# Patient Record
Sex: MALE | Race: WHITE | Employment: OTHER | URBAN - METROPOLITAN AREA
[De-identification: names, ages, dates, MRNs, and addresses within clinical notes are randomized per-mention and may not be internally consistent; named-entity substitution may affect disease eponyms.]

---

## 2022-09-23 DIAGNOSIS — R06.02 SHORTNESS OF BREATH: Primary | ICD-10-CM

## 2022-10-11 ENCOUNTER — OFFICE VISIT (OUTPATIENT)
Dept: PULMONOLOGY | Age: 72
End: 2022-10-11
Payer: MEDICARE

## 2022-10-11 ENCOUNTER — HOSPITAL ENCOUNTER (OUTPATIENT)
Dept: GENERAL RADIOLOGY | Age: 72
Discharge: HOME | End: 2022-10-14
Payer: MEDICARE

## 2022-10-11 VITALS
OXYGEN SATURATION: 100 % | HEART RATE: 75 BPM | TEMPERATURE: 97.1 F | DIASTOLIC BLOOD PRESSURE: 75 MMHG | BODY MASS INDEX: 23.4 KG/M2 | WEIGHT: 158 LBS | HEIGHT: 69 IN | SYSTOLIC BLOOD PRESSURE: 155 MMHG

## 2022-10-11 DIAGNOSIS — R06.02 SHORTNESS OF BREATH: Primary | ICD-10-CM

## 2022-10-11 DIAGNOSIS — R06.02 SHORTNESS OF BREATH: ICD-10-CM

## 2022-10-11 DIAGNOSIS — J47.9 BRONCHIECTASIS WITHOUT COMPLICATION (HCC): ICD-10-CM

## 2022-10-11 LAB
EXPIRATORY TIME: NORMAL
FEF 25-75% %PRED-PRE: NORMAL
FEF 25-75% PRED: NORMAL
FEF 25-75%-PRE: NORMAL
FEV1 %PRED-PRE: 90 %
FEV1 PRED: NORMAL
FEV1/FVC %PRED-PRE: NORMAL
FEV1/FVC PRED: NORMAL
FEV1/FVC: 72 %
FEV1: 2.73 L
FVC %PRED-PRE: 91 %
FVC PRED: NORMAL
FVC: 3.78 L
PEF %PRED-PRE: NORMAL
PEF PRED: NORMAL
PEF-PRE: NORMAL

## 2022-10-11 PROCEDURE — 71046 X-RAY EXAM CHEST 2 VIEWS: CPT

## 2022-10-11 PROCEDURE — 99204 OFFICE O/P NEW MOD 45 MIN: CPT | Performed by: INTERNAL MEDICINE

## 2022-10-11 PROCEDURE — G8484 FLU IMMUNIZE NO ADMIN: HCPCS | Performed by: INTERNAL MEDICINE

## 2022-10-11 PROCEDURE — G8420 CALC BMI NORM PARAMETERS: HCPCS | Performed by: INTERNAL MEDICINE

## 2022-10-11 PROCEDURE — 94010 BREATHING CAPACITY TEST: CPT | Performed by: INTERNAL MEDICINE

## 2022-10-11 PROCEDURE — 1036F TOBACCO NON-USER: CPT | Performed by: INTERNAL MEDICINE

## 2022-10-11 PROCEDURE — 1123F ACP DISCUSS/DSCN MKR DOCD: CPT | Performed by: INTERNAL MEDICINE

## 2022-10-11 PROCEDURE — G8427 DOCREV CUR MEDS BY ELIG CLIN: HCPCS | Performed by: INTERNAL MEDICINE

## 2022-10-11 PROCEDURE — 3017F COLORECTAL CA SCREEN DOC REV: CPT | Performed by: INTERNAL MEDICINE

## 2022-10-11 RX ORDER — FENOFIBRATE 160 MG/1
160 TABLET ORAL DAILY
COMMUNITY
Start: 2022-09-15

## 2022-10-11 RX ORDER — FLUTICASONE FUROATE, UMECLIDINIUM BROMIDE AND VILANTEROL TRIFENATATE 100; 62.5; 25 UG/1; UG/1; UG/1
1 POWDER RESPIRATORY (INHALATION) DAILY
Qty: 1 EACH | Refills: 11 | Status: CANCELLED | OUTPATIENT
Start: 2022-10-11

## 2022-10-11 RX ORDER — IBUPROFEN 200 MG
TABLET ORAL PRN
COMMUNITY

## 2022-10-11 RX ORDER — FLUTICASONE FUROATE, UMECLIDINIUM BROMIDE AND VILANTEROL TRIFENATATE 100; 62.5; 25 UG/1; UG/1; UG/1
1 POWDER RESPIRATORY (INHALATION) DAILY
Qty: 1 EACH | Refills: 11 | Status: SHIPPED | OUTPATIENT
Start: 2022-10-11

## 2022-10-11 RX ORDER — ATORVASTATIN CALCIUM 10 MG/1
10 TABLET, FILM COATED ORAL
COMMUNITY
Start: 2022-09-15

## 2022-10-11 RX ORDER — SILDENAFIL 100 MG/1
50-100 TABLET, FILM COATED ORAL PRN
COMMUNITY
Start: 2022-09-15

## 2022-10-11 RX ORDER — ERGOCALCIFEROL 1.25 MG/1
CAPSULE ORAL DAILY
COMMUNITY

## 2022-10-11 ASSESSMENT — PULMONARY FUNCTION TESTS
FEV1/FVC: 72
FEV1: 2.73
FEV1_PERCENT_PREDICTED_PRE: 90
FVC_PERCENT_PREDICTED_PRE: 91
FVC: 3.78

## 2022-10-11 NOTE — PROGRESS NOTES
Patient Name:  Felicia Eric                               YOB: 1950  MRN: [id number]                                                Office Visit 10/11/2022    ASSESSMENT AND PLAN:  (Medical Decision Making)    1. Shortness of breath  Plan to have images pushed from [facility] to our PACS for review. He had a HRCT in July 2020 that demonstrated bronchiectasis. Obtain complete pulmonary function testing and will perform trial of triple bronchodilator therapy. Will obtain alpha-1 testing in light of his family history. CPET in past demonstrated borderline breathing reserve but no other major abnormality.      -     Spirometry Without Bronchodilator  -     fluticasone-umeclidin-vilant (TRELEGY ELLIPTA) 100-62.5-25 MCG/INH AEPB; Inhale 1 puff into the lungs daily, Disp-1 each, R-11Fill ONLY upon patient request please. Normal    2. Bronchiectasis without complication ([address]  -     fluticasone-umeclidin-vilant (Daun Hunger) 100-62.5-25 MCG/INH AEPB; Inhale 1 puff into the lungs daily, Disp-1 each, R-11Fill ONLY upon patient request please. Normal     Jessica Fulton MD    Follow-up and Dispositions    Return in about 3 months (around 1/11/2023) for CPFT First Available, With MD or NP. Total time for encounter on day of encounter was 45 minutes. This time includes chart prep, review of tests/procedures, review of other provider's notes, documentation and counseling patient regarding disease process and medications. ___________________________________________________________________         ______      REASON FOR VISIT:   Chief Complaint   Patient presents with    New Patient    Shortness of Breath       HISTORY OF PRESENT ILLNESS:    Mr. Felicia Eric is a 71 y.o. male with a PMH of htn, hyperlipidemia, insomnia, ED, bronchiectasis, prediabetes, HFpEF w Grade 1 diastolic dysfunction who is seen at [facility] today for shortness of breath.   He has a limited 4pkyr rior smoking history (quit ). Mr. Kaykay Wilkerson noticed shortness of breath with heavy exertion 3 years ago hiking. He passed out 2 frazier ago while pulling the cord for his generator. Still bothers him when starting generator. Walking up his driveway at [id number] 12 deg incline he is winded and has to stop. No wheezing coughing or bringing up mucus. He denies any lower extremity edema or orthopnea. He has an albuterol inhaler but since he only notices the shortness of breath with really heavy exertion, he usually just stops the activity. He does not get recurrent bronchitis and is fully immunized against pneumonia. He underwent a CPET in 2020 which was essentially normal with slightly limited breathing reserve. Tobacco Use      Smoking status: Former        Packs/day: 1.00        Years: 4.00        Pack years: 4        Types: Cigarettes        Quit date:         Years since quittin.8      Smokeless tobacco: Never    Second Hand Smoke Exposure: Yes  Birds: No  Asbestos: No  TB: No  Hot Tubs/Humidifier: No  Organic/Inorganic Dusts: No  Molds: No  Occupation/Hobbies: Worked in construction.  for 15 years. Family history is notable for mother who had emphysema. Grandfather had emphysema  From [facility] but has lived in [place] for many years. REVIEW OF SYSTEMS:   10 point review of systems is negative except as reported in HPI. PHYSICAL EXAM:   Vitals:    10/11/22 0815   BP: (!) 155/75   Pulse: 75   Temp: 97.1 °F (36.2 °C)   TempSrc: Skin   SpO2: 100%   Weight: 158 lb (71.7 kg)   Height: 5' 8.5\" (1.74 m)     Body mass index is 23.67 kg/m². General:   Alert, cooperative, no distress, appears stated age. Eyes/Ears/Nose:   Conjunctivae/corneas clear. PERRL.  Nasal mucosa is normal.  Normal         Mouth/Throat:  Lips, mucosa, and tongue normal. Teeth and gums normal.        Lungs:   Clear without crackles or wheezes     Heart:   Regular rate and rhythm, S1, S2 normal, no murmur, click, rub or gallop. Abdomen:    Soft, non-tender. Extremities:  Extremities normal, atraumatic, no cyanosis or edema. Skin:  Skin color normal. No rashes or lesions     Neurologic:  A&Ox3     DIAGNOSTIC TESTS:                                                                                                                        Pulmonary Function Testing: No clear obstruction. Date 10/11/2022        FVC 3.78 (91%)        FEV1 2.73 (90%)        FEV1/FVC 0.72        FEF 25-75%         TLC         FRC         RV         DLCO           AMBULATING OXIMETRY: 10/11/2022 O2 sat HR   Room air at Rest 100% 69bpm   Room air ambulating 98% 116bpm   No results found for this or any previous visit. No results found for this or any previous visit. LABS: No results found for: WBC, HGB, HCT, PLT, TSH, IGE, NTPROBNP, MILLI, ANCA, RF, ESR, CRP    Imaging: I performed an independent interpretation of the patient's images. CXR:   10/11/2022        XR CHEST STANDARD TWO VW 10/11/2022    Narrative  EXAM: XR CHEST (2 VW)  INDICATION: Shortness of breath  COMPARISON: None    FINDINGS:  The cardiomediastinal silhouette is within normal limits. No focal parenchymal  process. No pleural effusion. No pneumothorax. No acute osseous abnormality. Impression  No acute cardiopulmonary abnormality. CT Chest 7/30/2020:      INDICATION: Concern for possible underlying diffuse lung disease, sarcoidosis or lymphoma. Unexplained hypercalcemia. CHEST CT FINDINGS:          PULMONARY PARENCHYMA and PLEURA: Mild diffuse cylindrical bronchiectasis is present. No significant air trapping identified. In the paramediastinal lower lobes a minimal component of groundglass opacity likely due to microatelectasis in this area appears present. I don't believe it has changed substantially since the prior exam. I don't see convincing evidence of diffuse interstitial lung disease.  No peribronchial or perilymphatic interstitial nodularity is identified to suggest pulmonary sarcoidosis. MAREK and MEDIASTINUM: No additional significant abnormality. HEART, AORTA and PULMONARY VESSELS: Atherosclerotic calcification of coronary arteries of extensive nature appears present. BASE of NECK, SOFT TISSUES and SKELETAL: Thoracic spondylosis noted. IMAGED UPPER ABDOMEN: Diffuse hepatic steatosis is present. CPET 2020  Rest data was gathered prior to exercise. The patient exercised for 8 minutes and 8 seconds with a maximum workload of 6.3 METS and max VO2 of 2.143L/min. Maximum heart rate 146 (96% predicted using 220-age), maximum /80mmHg. Exercise was stopped secondary leg fatigue moreso than dyspnea.        CARDIOVASCULAR RESPONSE:   Blood pressure:   Restin/72   Peak exercise: 178/80     Oxygen uptake and work rate:   Peak VO2 (L/min): 2.143     (108% predicted)   Peak VO2 (mL/kg/min): 28     (127% predicted)   Anaerobic threshold (L/min): 1.471    (74% predicted VO2max)   Peak work rate (frank): 156     (116% predicted)     Resting heart rate (beats/min): 85   Heart rate at peak VO2: 146   Estimated peak HR (220-age, beats/min): 151   Heart rate reserve (beats/min): 13   O2 pulse at peak VO2 (mL O2/beat): 14.9    (normal >12M, >10F)     VENTILATORY RESPONSE:   Baseline spirometry:   FVC 4.04L (102 % of predicted)   FEV1 3.16L (109 % of predicted)   Ratio FEV1/FVC 78   Estimated MVV (FEV1*40) = 126 L/min   Measured MVV = 109 L/min     Peak VE (L/min): 115   Breathing reserve (Estimated MVV-Peak VE, L/min): 11 (normal > 11M, >15F)   Respiratory rate at Peak VO2: 35    (normal < 50)   RR/TVmax (bpm/L): 13     (normal < 25M, < 35F)   VE/VCO2 at AT: 30       (normal < 34)   PETCO2 at AT: 33.9      (normal > 35)   O2 sat by oximetry (rest, peak): 99% (no supplemental oxygen was used)     FINDINGS:   Cardiovascular findings:   Exercise tolerance is normal (127% of the reference value for a healthy but non-athletic male of this size and age). (VO2 peak is > 84% predicted or > 80% in patients 79 years old). The anaerobic threshold was normal.  The peak heart rate was reached, suggesting good patient effort. The relationship between the HR and VO2 (the oxygen pulse) was normal. The blood pressure was normal at rest and increased appropriately with exercise. Ventilatory findings:   Pre-exercise spirometry showed: Normal spirometry. Post-exercise spirometry was not performed. The ventilatory response to exercise was characterized by ratios of VE to VCO2 which were appropriate. The breathing reserve was at the lower limit of normal with a peak VE that was 107 % of the estimated MVV. Pulse oximetry suggested normal oxygenation with exercise. INTERPRETATION:   Normal peak VO2 which would suggest a normal exercise study, early heart or lung disease, or obesity as a consideration. EKG with 1-1.2mm ST depression in V5-6 at peak exercise which normalized at rest without any exercise induced symptoms, and seems similar to findings from his echo stress test in 5/2019 which was negative for ischemia. No obvious ventilatory limitations. He is mildly overweight but not obese. Overall, this appears to be a normal exercise study with very good exercise capacity. Given his complaint of leg discomfort/weakness and cramping post-exercise, consider additional workup for peripheral vascular disease. Full graphs/figures/CPET data will be scanned to Epic for additional review. Thank you for allowing us to participate in the care of this patient. References:   Reference set for VO2: Faust/Bertin equations (Principles of Exercise Testing and Interpretation, 5th edition, p. 158)     Walter Bridges MD   Pulmonary and Critical Care Medicine   9/23/2020    Stress ECHO Findings 5/23/19  A stress test was performed following the David protocol   The patient achieved the target heart rate.    The patient reported no symptoms during the stress test. The patient experienced no angina during the stress test.   Blood pressure demonstrated a normal response to stress. Overall, the patient's exercise capacity was excellent. ECG   Resting ECG is normal. Poor R wave progression. Upward-sloping ST segment depression of 0.5 mm was noted during stress in the V5 and V6 leads, beginning at 11 minutes of stress, and returning to baseline after less than 1 minute of recovery. No arrhythmias during stress. There were rare PVCs arrhythmias during recovery. Arrhythmias were not significant. Stress ECG was Borderline     Left Ventricle - Pre Stress   Normal left ventricle cavity size. The left ventricular systolic function is normal. No wall motion abnormalities were present. Response to Stress - Exercise   The left ventricle size decreased from baseline. The left ventricular systolic function improved from baseline. Study Impression - Exercise   Image quality: excellent. The study is normal  REFERENCE INFO:                                                                                                                            History reviewed. No pertinent past medical history.   No Known Allergies  Current Outpatient Medications   Medication Instructions    atorvastatin (LIPITOR) 10 mg, Oral    fenofibrate (TRIGLIDE) 160 mg, Oral, DAILY    fluticasone-umeclidin-vilant (TRELEGY ELLIPTA) 100-62.5-25 MCG/INH AEPB 1 puff, Inhalation, DAILY    ibuprofen (ADVIL;MOTRIN) 200 MG tablet Oral, PRN    sildenafil (VIAGRA)  mg, Oral, PRN    vitamin D (ERGOCALCIFEROL) 1.25 MG (89344 UT) CAPS capsule Oral, DAILY

## 2022-11-03 ENCOUNTER — TELEPHONE (OUTPATIENT)
Dept: PULMONOLOGY | Age: 72
End: 2022-11-03

## 2022-11-03 NOTE — TELEPHONE ENCOUNTER
----- Message from Faby Marin MD sent at 11/1/2022  2:39 PM EDT -----  Please notify patient that the alpha-1 test was normal.  We performed this test to look a genetic form of COPD or airways disease. We can review more fully in the future, but this is good news     1ST . ATTEMPT TO CALL  TO SHARE RESULTS.  LVM

## 2022-11-04 NOTE — TELEPHONE ENCOUNTER
Attempted to contact patient regatrding Alpha 1 test results.  N/A, Dr. Martin Erwin will discuss at next F/U. /dd

## 2022-11-07 ENCOUNTER — TELEPHONE (OUTPATIENT)
Dept: PULMONOLOGY | Age: 72
End: 2022-11-07

## 2022-12-06 ENCOUNTER — NURSE ONLY (OUTPATIENT)
Dept: PULMONOLOGY | Age: 72
End: 2022-12-06
Payer: MEDICARE

## 2022-12-06 DIAGNOSIS — R06.02 SHORTNESS OF BREATH: Primary | ICD-10-CM

## 2022-12-06 LAB
FEV 1 , POC: 3.06 L
FEV1 % PRED, POC: 84 %
FEV1/FVC, POC: NORMAL
FVC % PRED, POC: 86 %
FVC, POC: NORMAL

## 2022-12-06 PROCEDURE — 94060 EVALUATION OF WHEEZING: CPT | Performed by: INTERNAL MEDICINE

## 2022-12-06 PROCEDURE — 94729 DIFFUSING CAPACITY: CPT | Performed by: INTERNAL MEDICINE

## 2022-12-06 PROCEDURE — 94726 PLETHYSMOGRAPHY LUNG VOLUMES: CPT | Performed by: INTERNAL MEDICINE

## 2022-12-06 ASSESSMENT — PULMONARY FUNCTION TESTS
FVC_PERCENT_PREDICTED_POC: 86
FEV1_PERCENT_PREDICTED_POC: 84

## 2022-12-06 NOTE — PATIENT INSTRUCTIONS
HOW WILL I GET MY TEST RESULTS? If your test was ordered by a provider who is not a [facility] provider, you will receive your results from the provider who ordered the test. If you have not heard from the provider who ordered the test and it has been 2 weeks since your test, we recommend that you call that provider to seek results. If your pulmonary provider ordered the test, they will review those results with you during your follow up appointment, unless he or she specifically informed you that we will call you with your results. In that case, please allow 5 business days for the study to be read and resulted to your provider. If you have not received a call after that time, please call our office at [phone].      Thank you,     Your providers at [facility]

## 2023-01-11 ENCOUNTER — OFFICE VISIT (OUTPATIENT)
Dept: PULMONOLOGY | Age: 73
End: 2023-01-11
Payer: MEDICARE

## 2023-01-11 VITALS
WEIGHT: 165 LBS | OXYGEN SATURATION: 100 % | TEMPERATURE: 97 F | SYSTOLIC BLOOD PRESSURE: 136 MMHG | DIASTOLIC BLOOD PRESSURE: 78 MMHG | HEART RATE: 81 BPM | HEIGHT: 68 IN | BODY MASS INDEX: 25.01 KG/M2

## 2023-01-11 DIAGNOSIS — Z87.891 HISTORY OF TOBACCO USE: ICD-10-CM

## 2023-01-11 DIAGNOSIS — R06.02 SHORTNESS OF BREATH: Chronic | ICD-10-CM

## 2023-01-11 DIAGNOSIS — Z71.85 VACCINE COUNSELING: ICD-10-CM

## 2023-01-11 DIAGNOSIS — J47.9 BRONCHIECTASIS WITHOUT COMPLICATION (HCC): Primary | ICD-10-CM

## 2023-01-11 PROCEDURE — G8427 DOCREV CUR MEDS BY ELIG CLIN: HCPCS | Performed by: NURSE PRACTITIONER

## 2023-01-11 PROCEDURE — 1036F TOBACCO NON-USER: CPT | Performed by: NURSE PRACTITIONER

## 2023-01-11 PROCEDURE — G8484 FLU IMMUNIZE NO ADMIN: HCPCS | Performed by: NURSE PRACTITIONER

## 2023-01-11 PROCEDURE — G8417 CALC BMI ABV UP PARAM F/U: HCPCS | Performed by: NURSE PRACTITIONER

## 2023-01-11 PROCEDURE — 99214 OFFICE O/P EST MOD 30 MIN: CPT | Performed by: NURSE PRACTITIONER

## 2023-01-11 PROCEDURE — 1123F ACP DISCUSS/DSCN MKR DOCD: CPT | Performed by: NURSE PRACTITIONER

## 2023-01-11 PROCEDURE — 3017F COLORECTAL CA SCREEN DOC REV: CPT | Performed by: NURSE PRACTITIONER

## 2023-01-11 RX ORDER — ALBUTEROL SULFATE 90 UG/1
AEROSOL, METERED RESPIRATORY (INHALATION)
Qty: 1 EACH | Refills: 11 | Status: SHIPPED | OUTPATIENT
Start: 2023-01-11

## 2023-01-11 RX ORDER — ALBUTEROL SULFATE 90 UG/1
AEROSOL, METERED RESPIRATORY (INHALATION)
Qty: 1 EACH | Refills: 11 | Status: SHIPPED | OUTPATIENT
Start: 2023-01-11 | End: 2023-01-11

## 2023-01-11 RX ORDER — FLUTICASONE FUROATE AND VILANTEROL 100; 25 UG/1; UG/1
POWDER RESPIRATORY (INHALATION)
Qty: 1 EACH | Refills: 11 | Status: SHIPPED | OUTPATIENT
Start: 2023-01-11

## 2023-01-11 RX ORDER — FLUTICASONE FUROATE AND VILANTEROL 100; 25 UG/1; UG/1
POWDER RESPIRATORY (INHALATION)
Qty: 1 EACH | Refills: 11 | Status: SHIPPED | OUTPATIENT
Start: 2023-01-11 | End: 2023-01-11

## 2023-01-11 ASSESSMENT — ENCOUNTER SYMPTOMS
WHEEZING: 1
COUGH: 0
HEMOPTYSIS: 0

## 2023-01-11 NOTE — PATIENT INSTRUCTIONS
Begin samples of Breo 100, 1 inhalation every morning, rinse mouth after use. I have provided prescription for Breo for him to fill at his local pharmacy if he perceives benefit from. Prescription for albuterol inhaler is provided to use if needed for shortness of breath, wheezing, cough/chest congestion and mucus clearance. Prevnar 20-prescription provided for him to obtain at his local pharmacy. Advised to check with primary provider to determine if he has had pneumonia vaccines. Recommend seasonal flu vaccine and newest COVID booster. Follow-up in 6 months, sooner if needed.

## 2023-01-11 NOTE — PROGRESS NOTES
[address] [place]. [address], [address] [facility]  [phone]    Patient Name:  Roxane Paige    YOB: 1950    Office Visit 1/11/2023      CHIEF COMPLAINT:      Chief Complaint   Patient presents with    Follow-up    Shortness of Breath    Other     bronchiectasis         ASSESSMENT:   (Medical Decision Making)                                                                                                                                          Encounter Diagnoses   Name Primary? Bronchiectasis without complication (HCC) Yes    Shortness of breath     History of tobacco use     Comment: limited and remote, accumulated 4 pack years before quitting in 1973     Vaccine counseling      Spirometry, lung volumes normal.  Mildly reduced diffusion capacity. Echocardiogram 2019 without evidence of pulmonary hypertension. HRCT of chest revealed no evidence of ILD. He did perceive some improvement in shortness of breath and toward end of Trelegy sample. We can try Breo instead of Yana Drain is not clearly indicated. He does not meet criteria for screening CT. Has not had numerous COVID booster, seasonal flu vaccine. He is unsure if he has had pneumonia vaccines but advised to check with his primary provider. PLAN:     Begin samples of Breo 100, 1 inhalation every morning, rinse mouth after use. I have provided prescription for Breo for him to fill at his local pharmacy if he perceives benefit from. Prescription for albuterol inhaler is provided to use if needed for shortness of breath, wheezing, cough/chest congestion and mucus clearance. Prevnar 20-prescription provided for him to obtain at his local pharmacy. Advised to check with primary provider to determine if he has had pneumonia vaccines. Recommend seasonal flu vaccine and newest COVID booster. Follow-up in 6 months, sooner if needed.     Reviewed nature and management of bronchiectasis, which in his case is mild. Orders Placed This Encounter    Fluticasone Furoate-Vilanterol (BREO ELLIPTA) 100-25 MCG/ACT AEPB     Si inhalation every morning, rinse mouth after use     Dispense:  1 each     Refill:  11    albuterol sulfate HFA (PROVENTIL;VENTOLIN;PROAIR) 108 (90 Base) MCG/ACT inhaler     Si puffs 4 times daily if needed for shortness of breath or wheezing     Dispense:  1 each     Refill:  11    pneumococcal 20-valent conjugat (PREVNAR) 0.5 ML BELLE inj     Sig: Inject 0.5 mLs into the muscle once for 1 dose     Dispense:  1 each     Refill:  0             RUTH JACKMAN - HUI    Total  time spent with patient - 38 min. Collaborating MD: Dr. Aram Robertson:    He is a very pleasant 73-year-old male, seen today for follow-up of shortness of breath, bronchiectasis, history of tobacco use (4 pack years before quitting in ), seen by Dr. Anneliese Lawrence as a new patient 10/2022, records are reviewed. He was begun on Trelegy sample. He did not fill prescription at the pharmacy, states he was unaware that it was sent in. Today, he reports exertional dyspnea only with strenuous activity or walking up a steep incline in his driveway. He denies wheezing, significant cough. Has history of pneumonia but denies whooping cough, recurrent bronchitis. States that he had albuterol inhaler in the past, unsure if it provided definite benefit. Reviewed findings on diagnostics including HRCT from  (mild bronchiectasis, no ILD) CPFT's as noted below, normal alpha-1. DIAGNOSTICS:    ECHO 2019-EF 55-65%, mild predominantly basal septal hypertrophy, grade 1 diastolic dysfunction. Unable to estimate RVSP secondary to inadequate TR jet. Chest CT 2020-mild diffuse cylindrical bronchiectasis. Minimal component of groundglass opacity likely due to microatelectasis in the lower lobes.   No evidence of nodular interstitial disease or mediastinal/hilar adenopathy. Diffuse hepatic steatosis. CPET 9/2020-Normal peak VO2 which would suggest a normal exercise study, early heart or lung disease, or obesity as a consideration. EKG with 1-1.2mm ST depression in V5-6 at peak exercise which normalized at rest without any exercise induced symptoms, and seems similar to findings from his echo stress test in 5/2019 which was negative for ischemia. No obvious ventilatory limitations. He is mildly overweight but not obese. Overall, this appears to be a normal exercise study with very good exercise capacity. Given his complaint of leg discomfort/weakness and cramping post-exercise, consider additional workup for peripheral vascular disease. Ambulatory oximetry 10/2022-baseline saturation 100% room air at rest, 98% room air with ambulation. Spirometry 10/11/2022-no clear obstruction. CXR 10/11/2022-no acute cardiopulmonary abnormality. Alpha-1 10/2022-normal.  CPFT's 12/6/2022-spirometry, lung volumes and flow volume loops are normal.  No bronchodilator dilator response by ATS criteria. Diffusion capacity is mildly reduced.    _____________________________________________________________      REVIEW OF SYSTEMS:    Review of Systems   Constitutional: Positive for weight gain. Negative for chills and fever. Respiratory:  Positive for wheezing. Negative for cough and hemoptysis. Shortness of breath only with strenuous activity or walking up a steep incline. He exercises regularly. PHYSICAL EXAM:    Vitals:    01/11/23 0842   BP: 136/78   Pulse: 81   Temp: 97 °F (36.1 °C)   TempSrc: Skin   SpO2: 100%   Weight: 165 lb (74.8 kg)   Height: 5' 8\" (1.727 m)        Body mass index is 25.09 kg/m². GENERAL APPEARANCE:  The patient is normal weight and in no respiratory distress. HEENT:  PERRL. Conjunctivae unremarkable. NECK/LYMPHATIC:   Symmetrical with no elevation of jugular venous pulsation. Trachea midline.    LUNGS:   Normal respiratory effort with symmetrical lung expansion. Breath sounds - decreased but completely clear. HEART:   There is a normal rate and regular rhythm. No murmur, rub, or gallop. There is no edema in the lower extremities. NEURO:  The patient is alert and oriented to person, place, and time. Memory appears intact and mood is normal.  No gross sensorimotor deficits are present. DIAGNOSTIC TESTS: Studies were personally reviewed by me and discussed with the patient. Spirometry:    Office Spirometry Results Latest Ref Rng & Units 10/11/2022   FVC L 3.78   FEV1 L 2.73   FEV1 %PRED-PRE % 90   FVC %PRED-PRE % 91   FEV1/FVC % 72     CPFT's:        CXR:        XR CHEST (2 VW) 10/11/2022    FINDINGS:  The cardiomediastinal silhouette is within normal limits. No focal parenchymal  process. No pleural effusion. No pneumothorax. No acute osseous abnormality. Impression  No acute cardiopulmonary abnormality. Social History     Socioeconomic History    Marital status:      Spouse name: None    Number of children: None    Years of education: None    Highest education level: None   Tobacco Use    Smoking status: Former     Packs/day: 1.00     Years: 4.00     Pack years: 4.00     Types: Cigarettes     Quit date:      Years since quittin.0    Smokeless tobacco: Never       History reviewed. No pertinent family history.     No Known Allergies    Current Outpatient Medications   Medication Sig    vitamin D (ERGOCALCIFEROL) 1.25 MG (74765 UT) CAPS capsule Take by mouth daily    atorvastatin (LIPITOR) 10 MG tablet Take 10 mg by mouth    fenofibrate (TRIGLIDE) 160 MG tablet Take 160 mg by mouth daily    sildenafil (VIAGRA) 100 MG tablet Take  mg by mouth as needed    ibuprofen (ADVIL;MOTRIN) 200 MG tablet Take by mouth as needed (Patient not taking: Reported on 2023)    fluticasone-umeclidin-vilant (TRELEGY ELLIPTA) 100-62.5-25 MCG/INH AEPB Inhale 1 puff into the lungs daily (Patient not taking: Reported on 1/11/2023)     No current facility-administered medications for this visit. Over 50% of today's office visit was spent in face to face time reviewing test results, prognosis, importance of compliance, education about disease process, benefits of medications, instructions for management of acute symptoms, and follow up plans. Electronically signed. Dictated using voice recognition software.   Proof read but unrecognized errors may exist.

## 2023-01-11 NOTE — PROGRESS NOTES
He is a 78-year-old male, seen today for follow-up of shortness of breath, bronchiectasis, history of tobacco use (4 pack years before quitting in 1973), seen by Dr. Kalina Hess as a new patient 10/2022, records are reviewed. He was begun on Trelegy. DIAGNOSTICS:    Chest CT 7/2020-mild diffuse cylindrical bronchiectasis. Minimal component of groundglass opacity likely due to microatelectasis in the lower lobes. CPET 9/2020-Normal peak VO2 which would suggest a normal exercise study, early heart or lung disease, or obesity as a consideration. EKG with 1-1.2mm ST depression in V5-6 at peak exercise which normalized at rest without any exercise induced symptoms, and seems similar to findings from his echo stress test in 5/2019 which was negative for ischemia. No obvious ventilatory limitations. He is mildly overweight but not obese. Overall, this appears to be a normal exercise study with very good exercise capacity. Given his complaint of leg discomfort/weakness and cramping post-exercise, consider additional workup for peripheral vascular disease. Ambulatory oximetry 10/2022-baseline saturation 100% room air at rest, 98% room air with ambulation. Spirometry 10/11/2022-no clear obstruction. CXR 10/11/2022-no acute cardiopulmonary abnormality. Alpha-1 10/2022-normal.  CPFT's 12/6/2022-spirometry, lung volumes and flow volume loops are normal.  No bronchodilator dilator response by ATS criteria. Diffusion capacity is mildly reduced.

## 2023-06-01 ENCOUNTER — CLINICAL DOCUMENTATION (OUTPATIENT)
Dept: SLEEP MEDICINE | Age: 73
End: 2023-06-01

## 2023-08-25 ENCOUNTER — TELEPHONE (OUTPATIENT)
Dept: PULMONOLOGY | Age: 73
End: 2023-08-25

## 2023-08-25 NOTE — TELEPHONE ENCOUNTER
He has mild bronchiectasis. Continue with plan as noted in last OV. Diagnostic workup is delineated in my last OV. He needs to establish care [facility] internist who can refer to local pulmonologist.    Recommend that he stay up to date on immunizations per CDC guidelines.

## 2023-08-25 NOTE — TELEPHONE ENCOUNTER
----- Message from Sandy Carrasco RN sent at 8/25/2023 12:06 PM EDT -----  Regarding: FW: I have moved  Contact: [phone]    ----- Message -----  From: Anastacio Whiting  Sent: 8/21/2023  11:54 AM EDT  To: , #  Subject: I have moved                                     Hi  I have moved to [address] [place] [address] (30 min from [facility]). I will need to establish new care in this area. Should I look for a pulmonologist or wait to establish with a primary internist and have them set it up? Anything in particular I should tell them about my hx? I will request my medical records asap. Thank you.      Anastacio Whiting

## 2023-09-28 ENCOUNTER — CLINICAL DOCUMENTATION (OUTPATIENT)
Dept: PULMONOLOGY | Age: 73
End: 2023-09-28

## 2023-09-28 NOTE — PROGRESS NOTES
Left message for patient to call for appointment.       Return for Dr Taya Kunz, mild bronchiectasis, shortness of breath